# Patient Record
(demographics unavailable — no encounter records)

---

## 2025-04-24 NOTE — PLAN
[FreeTextEntry1] :  HCM )  follow w/ dental, ophthalmology/optometry, GYN as recommended. MMG Rx'd TDAP given A/B/R explained to pt, shingrix recommended to receive at pharmacy Ophthalmology referral given. 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

## 2025-04-24 NOTE — HISTORY OF PRESENT ILLNESS
[FreeTextEntry1] : CPE new pt evaluation [de-identified] : PMH photosensitivity. pt gets severe photodermatitis, request a letter so she can get a tinted window of car.  pt has had recent vision change since last year, with increase in glare, not able to see well. Last eye exam 2 years ago ( normal )  PSH urinary incontinence surgery 5 years ago.

## 2025-04-24 NOTE — REVIEW OF SYSTEMS
[Negative] : Heme/Lymph [Vision Problems] : vision problems [Skin Rash] : skin rash [Discharge] : no discharge [Pain] : no pain [Redness] : no redness [Itching] : no itching [de-identified] : photodermatitis

## 2025-04-24 NOTE — HEALTH RISK ASSESSMENT
[Yes] : Yes [2 - 3 times a week (3 pts)] : 2 - 3  times a week (3 points) [1 or 2 (0 pts)] : 1 or 2 (0 points) [No] : In the past 12 months have you used drugs other than those required for medical reasons? No [No falls in past year] : Patient reported no falls in the past year [0] : 2) Feeling down, depressed, or hopeless: Not at all (0) [PHQ-2 Negative - No further assessment needed] : PHQ-2 Negative - No further assessment needed [Never] : Never [Patient reported mammogram was normal] : Patient reported mammogram was normal [Patient reported PAP Smear was normal] : Patient reported PAP Smear was normal [Patient reported bone density results were normal] : Patient reported bone density results were normal [Patient reported colonoscopy was normal] : Patient reported colonoscopy was normal [HIV test declined] : HIV test declined [None] : None [With Significant Other] : lives with significant other [] :  [# Of Children ___] : has [unfilled] children [Fully functional (bathing, dressing, toileting, transferring, walking, feeding)] : Fully functional (bathing, dressing, toileting, transferring, walking, feeding) [Fully functional (using the telephone, shopping, preparing meals, housekeeping, doing laundry, using] : Fully functional and needs no help or supervision to perform IADLs (using the telephone, shopping, preparing meals, housekeeping, doing laundry, using transportation, managing medications and managing finances) [Reports normal functional visual acuity (ie: able to read med bottle)] : Reports normal functional visual acuity [Seat Belt] :  uses seat belt [Good] : ~his/her~  mood as  good [Hepatitis C test offered] : Hepatitis C test offered [Unemployed] : unemployed [de-identified] : aerobic, pilates [de-identified] : regular [[id number]] : 0 [EyeExamDate] : 2 years ago [Change in mental status noted] : No change in mental status noted [Language] : denies difficulty with language [Behavior] : denies difficulty with behavior [High Risk Behavior] : no high risk behavior [Reports changes in hearing] : Reports no changes in hearing [Reports changes in vision] : Reports no changes in vision [Reports changes in dental health] : Reports no changes in dental health [MammogramDate] : 2 years ago [PapSmearDate] : 2 years ago [BoneDensityDate] : 2 years ago [ColonoscopyDate] : 2 years ago